# Patient Record
(demographics unavailable — no encounter records)

---

## 2025-07-14 NOTE — PHYSICAL EXAM
[de-identified] : Healthy-appearing male in no acute distress.  He sits comfortably rises without difficulty and ambulates with a nonantalgic gait.  No point tenderness to palpation.  Range of motion is within normal limits.  Seated position straight leg raising negative.  Hip range of motion painless.  Light touch sensation intact. [de-identified] : 4 views lumbar spine taken today demonstrates disc degeneration with significant narrowing and a retrolisthesis at L5-S1.  There is moderate lumbar spondylosis elsewhere.  There is no instability on flexion-extension.  No obvious osseous lesions.

## 2025-07-14 NOTE — ASSESSMENT
[FreeTextEntry1] : Vinayak has lumbar degenerative disc disease and retrolisthesis L5-S1.  We do lengthy talk today in the office.  He is back to his normal self.  I would encourage him to get back into his regular exercise program.  Golf and tennis are acceptable.  Even just walking 3 to 4 days a week will be helpful for him.  I offered some physical therapy but he wants to hold off on that for the time being.  I am sending him a new prescription for Mobic 7 and half milligrams p.o. twice daily.  I am also going to send in a Medrol Dosepak to keep on hand in case he has another significant flareup.  If he does I suggest he call me right away and begin taking the steroids.  He is amenable to this plan.  All of his questions were addressed.  Follow-up with me as needed.

## 2025-07-14 NOTE — HISTORY OF PRESENT ILLNESS
[de-identified] : 63-year-old male presents for evaluation of back pain.  He has a history of a herniated disc dating back many years.  He gets flareups from time to time.  He had a flareup last October.  His most recent 1 was 2 or 3 weeks ago.  He was lifting boxes he was bent over had the sudden onset of pain.  The pain really stays right in his back.  It does not radiate into the legs.  Is not a sciatica type pain.  There is no numbness tingling or weakness.  He was really immobile for the first week when this happened then he found some NSAIDs from a shoulder injury he started taking those and really turned the corner.  Today he is pretty much back to his normal self.